# Patient Record
Sex: FEMALE | NOT HISPANIC OR LATINO | URBAN - METROPOLITAN AREA
[De-identification: names, ages, dates, MRNs, and addresses within clinical notes are randomized per-mention and may not be internally consistent; named-entity substitution may affect disease eponyms.]

---

## 2017-07-11 ENCOUNTER — CONVERSION ENCOUNTER (OUTPATIENT)
Dept: OTHER | Facility: HOSPITAL | Age: 72
End: 2017-07-11

## 2018-05-30 ENCOUNTER — OFFICE VISIT CONVERTED (OUTPATIENT)
Dept: FAMILY MEDICINE CLINIC | Age: 73
End: 2018-05-30
Attending: FAMILY MEDICINE

## 2018-08-21 ENCOUNTER — OFFICE VISIT CONVERTED (OUTPATIENT)
Dept: FAMILY MEDICINE CLINIC | Age: 73
End: 2018-08-21
Attending: FAMILY MEDICINE

## 2018-10-10 ENCOUNTER — OFFICE VISIT CONVERTED (OUTPATIENT)
Dept: FAMILY MEDICINE CLINIC | Age: 73
End: 2018-10-10
Attending: FAMILY MEDICINE

## 2019-01-01 ENCOUNTER — HOSPITAL ENCOUNTER (OUTPATIENT)
Dept: OTHER | Facility: HOSPITAL | Age: 74
Discharge: HOME | End: 2019-03-04
Attending: FAMILY MEDICINE

## 2019-01-01 ENCOUNTER — OFFICE VISIT CONVERTED (OUTPATIENT)
Dept: FAMILY MEDICINE CLINIC | Age: 74
End: 2019-01-01
Attending: FAMILY MEDICINE

## 2019-01-01 ENCOUNTER — HOSPITAL ENCOUNTER (OUTPATIENT)
Dept: OTHER | Facility: HOSPITAL | Age: 74
Discharge: HOME | End: 2019-09-27
Attending: FAMILY MEDICINE

## 2019-01-01 ENCOUNTER — HOSPITAL ENCOUNTER (OUTPATIENT)
Dept: OTHER | Facility: HOSPITAL | Age: 74
Discharge: HOME | End: 2019-10-03
Attending: FAMILY MEDICINE

## 2019-01-01 ENCOUNTER — HOSPITAL ENCOUNTER (OUTPATIENT)
Dept: OTHER | Facility: HOSPITAL | Age: 74
Discharge: HOME | End: 2019-06-19
Attending: FAMILY MEDICINE

## 2019-01-01 ENCOUNTER — HOSPITAL ENCOUNTER (OUTPATIENT)
Dept: OTHER | Facility: HOSPITAL | Age: 74
Discharge: HOME | End: 2019-01-14
Attending: FAMILY MEDICINE

## 2019-01-01 LAB
ALBUMIN SERPL-MCNC: 3.7 G/DL (ref 3.5–5)
ALBUMIN/GLOB SERPL: 0.9 {RATIO} (ref 1.4–2.6)
ALP SERPL-CCNC: 89 U/L (ref 43–160)
ALT SERPL-CCNC: 8 U/L (ref 10–40)
ANION GAP SERPL CALC-SCNC: 17 MMOL/L (ref 8–19)
APPEARANCE UR: ABNORMAL
APPEARANCE UR: CLEAR
AST SERPL-CCNC: 17 U/L (ref 15–50)
BACTERIA UR CULT: NORMAL
BILIRUB SERPL-MCNC: 0.32 MG/DL (ref 0.2–1.3)
BILIRUB UR QL: NEGATIVE
BILIRUB UR QL: NEGATIVE
BUN SERPL-MCNC: 15 MG/DL (ref 5–25)
BUN/CREAT SERPL: 19 {RATIO} (ref 6–20)
CALCIUM SERPL-MCNC: 9.5 MG/DL (ref 8.7–10.4)
CHLORIDE SERPL-SCNC: 97 MMOL/L (ref 99–111)
COLOR UR: ABNORMAL
COLOR UR: YELLOW
CONV BACTERIA: ABNORMAL
CONV BACTERIA: NEGATIVE
CONV CO2: 25 MMOL/L (ref 22–32)
CONV COLLECTION SOURCE (UA): ABNORMAL
CONV COLLECTION SOURCE (UA): ABNORMAL
CONV HYALINE CASTS IN URINE MICRO: ABNORMAL /[LPF]
CONV TOTAL PROTEIN: 7.7 G/DL (ref 6.3–8.2)
CONV UROBILINOGEN IN URINE BY AUTOMATED TEST STRIP: 0.2 {EHRLICHU}/DL (ref 0.1–1)
CONV UROBILINOGEN IN URINE BY AUTOMATED TEST STRIP: 1 {EHRLICHU}/DL (ref 0.1–1)
CREAT UR-MCNC: 0.77 MG/DL (ref 0.5–0.9)
ERYTHROCYTE [DISTWIDTH] IN BLOOD BY AUTOMATED COUNT: 12.7 % (ref 11.5–14.5)
GFR SERPLBLD BASED ON 1.73 SQ M-ARVRAT: >60 ML/MIN/{1.73_M2}
GLOBULIN UR ELPH-MCNC: 4 G/DL (ref 2–3.5)
GLUCOSE SERPL-MCNC: 100 MG/DL (ref 65–99)
GLUCOSE UR QL: NEGATIVE MG/DL
GLUCOSE UR QL: NEGATIVE MG/DL
HBA1C MFR BLD: 15 G/DL (ref 12–16)
HCT VFR BLD AUTO: 47.5 % (ref 37–47)
HGB UR QL STRIP: NEGATIVE
HGB UR QL STRIP: NEGATIVE
KETONES UR QL STRIP: NEGATIVE MG/DL
KETONES UR QL STRIP: NEGATIVE MG/DL
LEUKOCYTE ESTERASE UR QL STRIP: ABNORMAL
LEUKOCYTE ESTERASE UR QL STRIP: ABNORMAL
MCH RBC QN AUTO: 28.5 PG (ref 27–31)
MCHC RBC AUTO-ENTMCNC: 31.6 G/DL (ref 33–37)
MCV RBC AUTO: 90.2 FL (ref 81–99)
NITRITE UR QL STRIP: NEGATIVE
NITRITE UR QL STRIP: POSITIVE
OSMOLALITY SERPL CALC.SUM OF ELEC: 279 MOSM/KG (ref 273–304)
PH UR STRIP.AUTO: 5.5 [PH] (ref 5–8)
PH UR STRIP.AUTO: 6.5 [PH] (ref 5–8)
PLATELET # BLD AUTO: 187 10*3/UL (ref 130–400)
PMV BLD AUTO: 10 FL (ref 7.4–10.4)
POTASSIUM SERPL-SCNC: 5.2 MMOL/L (ref 3.5–5.3)
PROT UR QL: ABNORMAL MG/DL
PROT UR QL: NEGATIVE MG/DL
RBC # BLD AUTO: 5.27 10*6/UL (ref 4.2–5.4)
RBC #/AREA URNS HPF: ABNORMAL /[HPF]
RBC #/AREA URNS HPF: ABNORMAL /[HPF]
SODIUM SERPL-SCNC: 134 MMOL/L (ref 135–147)
SP GR UR: 1.01 (ref 1–1.03)
SP GR UR: 1.02 (ref 1–1.03)
SQUAMOUS SPT QL MICRO: ABNORMAL /[HPF]
T3FREE SERPL-MCNC: 3.9 PG/ML (ref 2–4.4)
T4 FREE SERPL-MCNC: 2 NG/DL (ref 0.9–1.8)
TSH SERPL-ACNC: 0.26 M[IU]/L (ref 0.27–4.2)
TSH SERPL-ACNC: 2.21 M[IU]/L (ref 0.27–4.2)
WBC # BLD AUTO: 12.5 10*3/UL (ref 4.8–10.8)
WBC #/AREA URNS HPF: ABNORMAL /[HPF]
WBC #/AREA URNS HPF: ABNORMAL /[HPF]

## 2021-06-05 NOTE — PROGRESS NOTES
Muriel Irene. 1945     Office/Outpatient Visit    Visit Date: Wed, May 30, 2018 10:37 am    Provider: Julisa Bianchi MD (Assistant: Yarelis Arroyo MA)    Location: [facility]        Electronically signed by Julisa Bianchi MD on  06/01/2018 10:23:33 AM                             SUBJECTIVE:        CC: FFT-seeing hospice         HPI:     She presents to the office after transitioning from hospice after no longer meeting the 6 month criteria. She was previously on hospice care for failure to thrive. Currently, she is requesting pain meds for chronic back pain and abd pain. Under hospice, she has been on methadone. Denies constipation and is on stool softeners daily. Muriel also takes reglan and Zofran for n/v but is requesting Phenergan due to faster onset of this medication. She takes Zofran q4h. She has known gastroparesis with workup with GI and Dr Smith and was told nothing else could be done, she is not a candidate for a Gtube per Dr Smith     Her wt is down 11 #s from last year-79 #.  She has a h/o chronically elevated WBC's and anemia due to chronic GI blood loss.  She does have lupus and  I have sent her to rheumatology for her lupus (as a cause of her gastroparesis) and she was told all is ok.  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 Her  is with her today and there have been concerns in the past over his alcohol use and poor care of her            Muriel also has depression and anxiety and currently takes Cymbalta and Zyprexa with improvement in her mood. Denies SI. She has had diminished sleep with terminal insomnia (no initial insomnia) and takes Trazadone nightly.         GERD is stable on pantoprazole.                 Additionally, she presents with history of acquired hypothyroidism, unspecified cause.  this was first diagnosed more than 5 years ago.  She is currently taking Synthroid, 75 mcg  daily.  She denies any related symptoms.  She reports no symptoms suggestive of adverse medication effect.      ROS:     CONSTITUTIONAL:  Positive for fatigue.   Negative for chills or fever.      EYES:  Positive for Legally blind.   Negative for blurred vision.      CARDIOVASCULAR:  Negative for chest pain, orthopnea, palpitations, paroxysmal nocturnal dyspnea, pedal edema and tachycardia.      RESPIRATORY:  Negative for recent cough, dyspnea and frequent wheezing.      GASTROINTESTINAL:  Positive for abdominal pain and nausea.   Negative for constipation, diarrhea or vomiting.      MUSCULOSKELETAL:  Positive for arthralgias, back pain, joint stiffness and hip pain R hip.      INTEGUMENTARY/BREAST:  Negative for rash.      NEUROLOGICAL:  Positive for dizziness.   Negative for headaches or weakness.      PSYCHIATRIC:  Negative for sleep disturbance and suicidal thoughts.          PMH/FMH/SH:     Last Reviewed on 5/30/2018 11:42 AM by Julisa Bianchi    Past Medical History:             PREVENTIVE HEALTH MAINTENANCE             BONE DENSITY: was last done 6/17/15 with the following abnormality noted-- osteoporosis takes Fosamax and calcium with vitamin d     COLONOSCOPY: Done within the last 10 years was last done 2012 with the following abnormalities noted-- diverticulosis     MAMMOGRAM: was last done 7/11/2017 with normal results     PNEUMOCOCCAL 23 VACCINE: was last done unknown         PAST MEDICAL HISTORY         Positive for    Hyperlipidemia and    Hypertension;     Positive for    COPD;     Postive for    Colonic Polyps,    Gastroesophageal Reflux Disease,    Melena due to iron intake and    Diverticulosis;     Positive for    Renal Stones,    Urinary Tract Infections, Recurrent and    Hydronephrosis;     Positive for    Osteoporosis and    Systemic Lupus Erythematosus;     Positive for    Hypothyroidism;     Positive for    Cerebrovascular Accident: 10/28/2001; ;     Positive for    Iron Deficiency  Anemia: due to chronic blood loss; and    Primary hypercoagulable state;     Positive for    Chronic elevated WBC count;     Positive for    Underweight with Adult Failure to Thrive,    Anxiety with depression,    Cortical blindness and    Sudden hearing loss;         GYNECOLOGICAL HISTORY:             CURRENT MEDICAL PROVIDERS:    Gastroenterologist: Dr Dorantes    Oncologist: Dr Johnson    Psychiatrist: Kadoli in [facility]    Rheumatologist: Dr Eden         Surgical History:         Positive for    Arthroscopy: shoulder, left;,    Cholecystectomy: ;,    Hysterectomy: BSO; and    Tonsillectomy + Adenoidectomy;     Positive for    Lung Resection: at age 19; for brochiectasis; ;     Positive for    Endoscopy: ; EGD 2016 bx-mild inflammation, hx ulcers;,    Fracture(s):    fracture of wrist: dx'd in 2016;     and right hip,     Exploratory Laparotomy,    Port placement;,    Wrist cyst surgery;,    cardiac cath  Normal; and    Cystoscopy  with Rt ureteroscopy and stone extraction;;         Family History:     Father:  at age 77; Cause of death was lung cancer     Mother:  at age 70; Cause of death was diabetes related;  cardiomyopathy         Social History:     Occupation: Disabled (due to Lupus)     Marital Status:      Children: 2 children         Tobacco/Alcohol/Supplements:     Last Reviewed on 2018 11:43 AM by Julisa Bianchi    Tobacco: She has never smoked.  Non-drinker         Substance Abuse History:     None             Allergies:     Last Reviewed on 2017 03:07 PM by Corrine Soriano    Ceftin:    Lortab:    Proventil:    Percodan:    Toradol:    Xifaxan:        Current Medications:     Last Reviewed on 2017 03:07 PM by Corrine Soriano    Cymbalta 30mg Capsules, Delayed Release Take 1 capsule(s) by mouth BID     Lorazepam 0.5mg Tablet 1 PO BID     Synthroid 0.075mg Tablet Take 1 tablet(s) by mouth daily--DO NOT SUBSTITUTE!!!!!     Zyprexa 5mg Tablet Take 1  tablet(s) by mouth daily at HS     Protonix 40mg Tablets, Delayed Release 1 tab bid     Trazodone HCl 100mg Tablet 1 tab hs     Lipitor 10mg Tablet One tablet by mouth daily at bedtime.     Promethazine HCl 25mg Tablet 1/2 - 1 tab q 4-6 hrs prn   prn nausea and vomiting     Hydrochlorothiazide (HCTZ) 12.5mg Tablet Take 1 tablet(s) by mouth daily     Carafate 1gm Tablets Take 1 tablet(s) by mouth qid     Ferrous Sulfate 325mg Tablets 1 TAB DAILY     Zofran 4mg Tablet 1 tab po Q6H PRN for nausea     Methadone HCl 10mg Tablet Take 1 tablet(s) by mouth TID         OBJECTIVE:        Vitals:         Current: 5/30/2018 10:39:50 AM    Ht:  5 ft, 5.25 in;  Wt: 79 lbs (Estimated);  BMI: 13.0    T: 97.9 F (oral);  BP: 126/78 mm Hg (left arm, sitting);  P: 65 bpm (left arm (BP Cuff), sitting);  sCr: 0.68 mg/dL;  GFR: 56.86        Exams:     PHYSICAL EXAM:     GENERAL: vital signs recorded - thin, malnourished;  well groomed;  no apparent distress;     E/N/T: OROPHARYNX:  normal mucosa, dentition, gingiva, and posterior pharynx;     NECK: supple, no LAD, FROM;     RESPIRATORY: tender to palpation over sternum and R fibs 5-8; CTA B WITHOUT WHEEZING/RALES OR RHONCHI, NORMAL RESP. EFFORT     CARDIOVASCULAR: regular rate and rhythm; normal S1, S2; no murmur, rub, or gallop; normal PMI;     GASTROINTESTINAL: mild diffuse tenderness;  no guarding;  no rebound tenderness;  normal bowel sounds; no organomegaly; no masses;     MUSCULOSKELETAL: gait: wheelchair-bound;     NEUROLOGIC: mental status: oriented to person, place, and time;  Reflexes: knee jerks: 2+;     PSYCHIATRIC:  appropriate affect and demeanor; normal speech pattern; grossly normal memory;         Lab/Test Results:             Amphetamines Screen, Urin:  Negative (05/30/2018),     BAR-Barbiturates Screen, Urin:  Negative (05/30/2018),     Buprenorphine:  Negative (05/30/2018),     BZO-Benzodiazepines Screen,Ur:  Positive (05/30/2018),     Cocaine(Metab.)Screen, Ur:   Negative (05/30/2018),     MDMA-Ecstasy:  Negative (05/30/2018),     Met-Methamphetamine:  Negative (05/30/2018),     MTD-Methadone Screen, Urine:  Positive (05/30/2018),     Opiate Screen, Urine:  Negative (05/30/2018),     OXY-Oxycodone:  Negative (05/30/2018),     PCP-Phencyclidine Screen, Uri:  Negative (05/30/2018),     THC Cannabinoids Screen, Urin:  Negative (05/30/2018),     Urine temperature:  confirmed (05/30/2018),     Date and time of last pill:  Methadone 5/30/18@8am  Lorazepam 5/30/18@8am (05/30/2018),     Performed by:  tls (05/30/2018),     Collection Time:  1206 (05/30/2018),             ASSESSMENT           783.41   R62.7  FTT              DDx:     780.52   G47.00  Insomnia, unspecified              DDx:     789.00   R10.9  Abdominal pain, unspecified              DDx:     244.9   E03.9  Acquired hypothyroidism, unspecified cause              DDx:     536.3   K31.84  Gastroparesis              DDx:     288.60   D72.829  Leukocytosis, unspecified              DDx:     272.0   E78.00  Essential hypercholesterolemia              DDx:     280.9   D50.9  Iron deficiency anemia              DDx:     300.4   F32.0   F41.1  Anxiety with depression              DDx:     401.1   I10  Essential hypertension, benign              DDx:     710.0   M32.10  Systemic lupus erythematosus              DDx:     V58.69   Z79.899  Use of high risk medications              DDx:     724.2   M54.5  Chronic low back pain              DDx:         ORDERS:         Meds Prescribed:       Refill of: Trazodone HCl 100mg Tablet 1 tab hs  #90 (Ninety) tablet(s) Refills: 1       Refill of: Zofran (Ondansetron HCl) 4mg Tablet 1 tab po Q6H PRN for nausea  #40 (Forty) tablet(s) Refills: 0         Lab Orders:       [id number]  PHYSF - [facility] PHYSICAL: CMP, CBC, TSH, LIPID: [id number], [id number], [id number], [id number]  (Send-Out)         85839  SED - H Sedimentation rate, non-automated ESR  (Send-Out)         15018  Drug test prsmv read direct optical obs pr  date  (In-House)           Procedures Ordered:       REFER  Referral to Specialist or Other Facility  (Send-Out)                   PLAN:          FTT chronic, she is on methadone and I cannot write for this med long term.          Insomnia, unspecified           Prescriptions:       Refill of: Trazodone HCl 100mg Tablet 1 tab hs  #90 (Ninety) tablet(s) Refills: 1       Refill of: Zofran (Ondansetron HCl) 4mg Tablet 1 tab po Q6H PRN for nausea  #40 (Forty) tablet(s) Refills: 0          Abdominal pain, unspecified chronic, she is on methadone and I cannot write for this med long term.          Acquired hypothyroidism, unspecified cause recheck labs today          Gastroparesis cont reglan and zofran, restart supplements tid for her low weight and FTT, Home health is to look in to this for her          Leukocytosis, unspecified     LABORATORY:  Labs ordered to be performed today include PHYSICAL PANEL; CMP, CBC, TSH, LIPID.            Orders:       [id number]  [facility] PHYSICAL: CMP, CBC, TSH, LIPID: [id number], [id number], [id number], [id number]  (Send-Out)            Essential hypercholesterolemia on lipitor, needs fasting lipids          Iron deficiency anemia will recheck labs          Anxiety with depression ongoing but stable          Essential hypertension, benign well controlled          Systemic lupus erythematosus will check ESR today     LABORATORY:  Labs ordered to be performed today include ESR.            Orders:       [id number]  [facility] Sedimentation rate, non-automated ESR  (Send-Out)            Use of high risk medications         RECOMMENDATIONS given include: hua reviewed, drug screen performed and appropriate, consent is reviewed and signed and on the chart, she is aware of risk of addiction on this medication and understands that she will need to follow up for a review every 3 months and her medications will be adjusted or decreased as deemed appropriate at each visit.  No personal history of drug or alcohol abuse.  No  concerns about diversion or abuse.  She denies side effects related to the medication.  She is  aware that she may be called in for pill counts..            Orders:       [id number]  Drug test prsmv read direct optical obs pr date  (In-House)            Chronic low back pain         REFERRALS:  Referral initiated to a chronic pain specialist ( [facility] ).            Orders:       REFER  Referral to Specialist or Other Facility  (Send-Out)               CHARGE CAPTURE           **Please note: ICD descriptions below are intended for billing purposes only and may not represent clinical diagnoses**        Primary Diagnosis:         783.41 FTT            R62.7    Adult failure to thrive              Orders:          [id number]   Office/outpatient visit; established patient, level 4  (In-House)           780.52 Insomnia, unspecified            G47.00    Insomnia, unspecified    789.00 Abdominal pain, unspecified            R10.9    Unspecified abdominal pain    244.9 Acquired hypothyroidism, unspecified cause            E03.9    Hypothyroidism, unspecified    536.3 Gastroparesis            K31.84    Gastroparesis    288.60 Leukocytosis, unspecified            D72.829    Elevated white blood cell count, unspecified    272.0 Essential hypercholesterolemia            E78.00    Pure hypercholesterolemia, unspecified    280.9 Iron deficiency anemia            D50.9    Iron deficiency anemia, unspecified    300.4 Anxiety with depression            F32.0    Major depressive disorder, single episode, mild           F41.1    Generalized anxiety disorder    401.1 Essential hypertension, benign            I10    Essential (primary) hypertension    710.0 Systemic lupus erythematosus            M32.10    Systemic lupus erythematosus, organ or system involvement unspecified    V58.69 Use of high risk medications            Z79.899    Other long term (current) drug therapy              Orders:          [id number]   Drug test prsmv read  direct optical obs pr date  (In-House)           724.2 Chronic low back pain            M54.5    Low back pain        ADDENDUMS:      ____________________________________    Addendum: 06/11/2018 03:31 PM - Corrine Soriano         Laboratory Orders Faxed to:        [facility]; Number [phone]            Addendum: 06/27/2018 02:52 PM - Corrine Soriano         Laboratory Orders Faxed to:        [facility]; Number [phone]            Addendum: 06/27/2018 02:58 PM - Corrine Soriano        Verbal order given to WINSTON Jasso for labs to be drawn at next home visit.

## 2021-06-05 NOTE — PROGRESS NOTES
Muriel Irene 1945     Office/Outpatient Visit    Visit Date: Wed, Oct 10, 2018 11:19 am    Provider: Julisa Bianchi MD (Assistant: Morgan Yarbrough)    Location: [facility]        Electronically signed by Julisa Bianchi MD on  10/16/2018 10:49:24 AM                             SUBJECTIVE:        CC: gastroparesis/chronic abd and back pain         HPI:     Muriel is following up for her chronic gastroparesis and abd pain.  She couldnt be seen last week due to severe nausea but this has improved.   She is on zofran and reglan daily.  She is seeing pain management and on morphine 15 mg bid and methadone 5 mg tid.   Her home health quit and she no longer has her bath aid, and she is wanting home health called in for this, I told her home health only qualifies if she needs nursing or PT.  Yuliet remains unsteady on her feet.  She is getting up to go to bed and bathroom.  She is legally blind.  Her wt is up 2 more #'s.   Her anxiety and depression are improving, she is on cymbalta and zyprexa.  She is sleeping well on trazodone scheduled.         With regard to the acquired hypothyroidism, unspecified cause, this was first diagnosed more than 5 years ago.  She is currently taking Synthroid, 75 mcg daily.  TSH was last checked 3 months ago.  The result was reported as normal.  She denies any related symptoms.  She reports no symptoms suggestive of adverse medication effect.          Additionally, she presents with history of essential hypercholesterolemia.  current treatment includes diet.  Compliance with treatment has been fair.  She denies experiencing any hypercholesterolemia related symptoms.  Most recent lab tests include Total Cholesterol:  161 (mg/dL) (05/16/2017), HDL:  79 (mg/dL) (05/16/2017), Triglycerides:  103 (mg/dL) (05/16/2017), LDL:  61 (mg/dL) (05/16/2017), TSH:  3.160 (mIU/L) (05/16/2017), Creatinine, Serum:  0.72 (mg/dl) (08/21/2018), Glom Filt Rate, Est:  >60 (ml/min/1.73m2) (08/21/2018),  Alkaline Phosphatase, Serum:  107 (U/L) (08/21/2018), ALT (SGPT):  11 (U/L) (08/21/2018), AST (SGOT):  18 (U/L) (08/21/2018), Hemoglobin:  12.10 (gm/dl) (08/21/2018), Hematocrit:  38.4 (%) (08/21/2018).          With regard to the essential hypertension, benign, this was first diagnosed >1-2 years ago.  Current nonpharmacologic treatment includes low sodium diet.  She is not currently taking an antihypertensive.  SERGEY does not check her blood pressure other than at her clinic appointments.  She is tolerating the medication well without side effects.      ROS:     CONSTITUTIONAL:  Positive for fatigue.   Negative for chills or fever.      EYES:  Positive for Legally blind.   Negative for blurred vision.      CARDIOVASCULAR:  Negative for chest pain, orthopnea, palpitations, paroxysmal nocturnal dyspnea, pedal edema and tachycardia.      RESPIRATORY:  Negative for recent cough, dyspnea and frequent wheezing.      GASTROINTESTINAL:  Positive for abdominal pain and nausea.   Negative for constipation, diarrhea or vomiting.      MUSCULOSKELETAL:  Positive for arthralgias, back pain, joint stiffness and hip pain R hip.      INTEGUMENTARY/BREAST:  Negative for rash.      PSYCHIATRIC:  Negative for sleep disturbance and suicidal thoughts.          PMH/FMH/SH:     Last Reviewed on 10/10/2018 11:51 AM by Julisa Bianchi    Past Medical History:             PREVENTIVE HEALTH MAINTENANCE             BONE DENSITY: was last done 6/17/15 with the following abnormality noted-- osteoporosis takes Fosamax and calcium with vitamin d     COLONOSCOPY: Done within the last 10 years was last done 2012 with the following abnormalities noted-- diverticulosis     MAMMOGRAM: was last done 7/11/2017 with normal results     PNEUMOCOCCAL 23 VACCINE: was last done unknown         PAST MEDICAL HISTORY         Positive for    Hyperlipidemia and    Hypertension;     Positive for    COPD;     Postive for    Colonic Polyps,    Gastroesophageal  Reflux Disease,    Melena due to iron intake and    Diverticulosis;     Positive for    Renal Stones,    Urinary Tract Infections, Recurrent and    Hydronephrosis;     Positive for    Osteoporosis and    Systemic Lupus Erythematosus;     Positive for    Hypothyroidism;     Positive for    Cerebrovascular Accident: 10/28/2001; ;     Positive for    Iron Deficiency Anemia: due to chronic blood loss; and    Primary hypercoagulable state;     Positive for    Chronic elevated WBC count;     Positive for    Underweight with Adult Failure to Thrive,    Anxiety with depression,    Cortical blindness and    Sudden hearing loss;         GYNECOLOGICAL HISTORY:             CURRENT MEDICAL PROVIDERS:    Gastroenterologist: Dr Dorantes    Oncologist: Dr Johnson    Pain Management: [facility]    Psychiatrist: [facility]oli in [place]    Rheumatologist: Dr Eden    [facility]: [facility] - d/c 2018         Surgical History:         Positive for    Arthroscopy: shoulder, left;,    Cholecystectomy: ;,    Hysterectomy: BSO; and    Tonsillectomy + Adenoidectomy;     Positive for    Lung Resection: at age 19; for brochiectasis; ;     Positive for    Endoscopy: ; EGD  bx-mild inflammation, hx ulcers;,    Fracture(s):    fracture of wrist: dx'd in 2016;     and right hip,     Exploratory Laparotomy,    Port placement;,    Wrist cyst surgery;,    cardiac cath  Normal; and    Cystoscopy  with Rt ureteroscopy and stone extraction;;         Family History:     Father:  at age 77; Cause of death was lung cancer     Mother:  at age 70; Cause of death was diabetes related;  cardiomyopathy         Social History:     Occupation: Disabled (due to Lupus)     Marital Status:      Children: 2 children         Tobacco/Alcohol/Supplements:     Last Reviewed on 10/10/2018 11:51 AM by Julisa Bianchi    Tobacco: She has never smoked.  Non-drinker         Substance Abuse History:     None             Allergies:     Last  Reviewed on 8/21/2018 12:17 PM by Talisha Dubois April    Ceftin:    Lortab:    Proventil:    Percodan:    Toradol:    Xifaxan:        Current Medications:     Last Reviewed on 8/21/2018 12:20 PM by Talisha Dubois April    Lorazepam 0.5mg Tablet 1 PO BID     Synthroid 0.075mg Tablet Take 1 tablet(s) by mouth daily--DO NOT SUBSTITUTE!!!!!     Protonix 40mg Tablets, Delayed Release 1 tab bid     Cymbalta 30mg Capsules, Delayed Release 1 po tid     Zofran 4mg Tablet 1 tab po Q6H PRN for nausea     Methadone HCl 5mg Tablet Take one tablet TID     Trazodone HCl 100mg Tablet 1 tab hs     Zyprexa 5mg Tablet Take 1 tablet(s) by mouth daily at HS     Carafate 1gm Tablets Take 1 tablet(s) by mouth qid     Morphine Sulfate 15mg Tablet Take 1 Tablet BID         OBJECTIVE:        Vitals:         Current: 10/10/2018 11:25:07 AM    Ht:  5 ft, 5.25 in;  Wt: 98 lbs;  BMI: 16.2    T: 98.3 F (oral);  BP: 140/85 mm Hg (right arm, sitting);  P: 86 bpm (left arm (BP Cuff), sitting);  sCr: 0.72 mg/dL;  GFR: 58.85        Exams:     PHYSICAL EXAM:     GENERAL: vital signs recorded - thin, malnourished;  well groomed;  no apparent distress;     E/N/T: OROPHARYNX:  normal mucosa, dentition, gingiva, and posterior pharynx;     NECK: supple, no LAD, FROM;     RESPIRATORY: CTA B WITHOUT WHEEZING/RALES OR RHONCHI, NORMAL RESP. EFFORT     CARDIOVASCULAR: regular rate and rhythm; normal S1, S2; no murmur, rub, or gallop; normal PMI;     GASTROINTESTINAL: mild diffuse tenderness;  no guarding;  no rebound tenderness;  normal bowel sounds; no organomegaly; no masses;     MUSCULOSKELETAL: gait: wheelchair-bound;     NEUROLOGIC: mental status: oriented to person, place, and time;  Reflexes: knee jerks: 2+;     PSYCHIATRIC:  appropriate affect and demeanor; normal speech pattern; grossly normal memory;         ASSESSMENT           789.00   R10.9  Abdominal pain, unspecified              DDx:     536.3   K31.84  Gastroparesis              DDx:      780.52   G47.00  Insomnia, unspecified              DDx:     244.9   E03.9  Acquired hypothyroidism, unspecified cause              DDx:     280.9   D50.9  Iron deficiency anemia              DDx:     300.4   F32.0   F41.1  Anxiety with depression              DDx:     401.1   I10  Essential hypertension, benign              DDx:     724.2   M54.5  Chronic low back pain              DDx:         ORDERS:         Lab Orders:       [id number]  TSH - [facility] TSH  (Send-Out)         72041  BDCB2 - [facility] CBC w/o diff  (Send-Out)                   PLAN:          Abdominal pain, unspecified stable on pain meds          Gastroparesis stable, cont zofran and reglan          Insomnia, unspecified stable on trazodone          Acquired hypothyroidism, unspecified cause stable on meds     LABORATORY:  Labs ordered to be performed today include TSH.            Orders:       [id number]  TSH - [facility] TSH  (Send-Out)            Iron deficiency anemia stable     LABORATORY:  Labs ordered to be performed today include CBC W/O DIFF.            Orders:       [id number]  BDCB2 - [facility] CBC w/o diff  (Send-Out)            Anxiety with depression ongoing but stable          Essential hypertension, benign stable          Chronic low back pain cont pain management             Other Orders:       [id number]  Office/outpatient visit; established patient, level 4  (In-House)           CHARGE CAPTURE           **Please note: ICD descriptions below are intended for billing purposes only and may not represent clinical diagnoses**        Primary Diagnosis:         789.00 Abdominal pain, unspecified            R10.9    Unspecified abdominal pain    536.3 Gastroparesis            K31.84    Gastroparesis    780.52 Insomnia, unspecified            G47.00    Insomnia, unspecified    244.9 Acquired hypothyroidism, unspecified cause            E03.9    Hypothyroidism, unspecified    280.9 Iron deficiency anemia            D50.9    Iron deficiency anemia, unspecified    300.4 Anxiety with  depression            F32.0    Major depressive disorder, single episode, mild           F41.1    Generalized anxiety disorder    401.1 Essential hypertension, benign            I10    Essential (primary) hypertension    724.2 Chronic low back pain            M54.5    Low back pain        Other Orders:           [id number]   Office/outpatient visit; established patient, level 4  (In-House)

## 2021-06-05 NOTE — PROGRESS NOTES
"Muriel Irene \"Yuliet\" 1945     Office/Outpatient Visit    Visit Date: Fri, Oct 25, 2019 01:25 pm    Provider: Mike Hooks MD (Assistant: Sarah Spurling, MA)    Location: [facility]        Electronically signed by Mike Hooks MD on  11/08/2019 07:07:03 AM                             SUBJECTIVE:        CC:     Mrs. Irene is a 74 year old White female.  The patient is accompanied into the exam room by her caretaker and whose name is Samra.  Pt is no longer a hospice pt, and she is here because she said Dr. Hooks wanted to see her.;         HPI:     Yuliet comes in today having recently been discharged from hospice services.  She seems in good humor and almost elated.  She says she is \"feisty\".  She has chronic gastroparesis and recurrent GI bleeding history.  Is been stable though for the past several months leading to second discharge from hospice.  Her pain currently is under good control in fact she says that her caregiver is pulling up syringes of morphine at nighttime and she is not using them.     Her anxiety and depression are okay on the current medications.  She even learned today that her brother-in-law is getting close to death seems to be coping well with that.         Concerning acquired hypothyroidism, unspecified cause, she denies any related symptoms.  She reports no symptoms suggestive of adverse medication effect.      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     ROS:     CONSTITUTIONAL:  Negative for chills, fatigue, fever, and weight change.      EYES:  Negative for blurred vision.      CARDIOVASCULAR:  Negative for chest pain, orthopnea, paroxysmal nocturnal dyspnea and pedal edema.      RESPIRATORY:  Negative for dyspnea.      GASTROINTESTINAL:  Negative for abdominal pain, constipation, diarrhea, nausea and vomiting.      GENITOURINARY:  Negative for dysuria and frequent urination.      NEUROLOGICAL:  Negative for dizziness, headaches, " paresthesias, and weakness.      PSYCHIATRIC:  Negative for anxiety, depression, and sleep disturbances.          PMH/FMH/SH:     Last Reviewed on 3/04/2019 12:45 PM by Julisa Bianchi    Past Medical History:             PREVENTIVE HEALTH MAINTENANCE             BONE DENSITY: was last done 6/17/15 with the following abnormality noted-- osteoporosis takes Fosamax and calcium with vitamin d     COLORECTAL CANCER SCREENING: Up to date (colonoscopy q10y; sigmoidoscopy q5y; Cologuard q3y) was last done 12, Results are in chart; colonoscopy with the following abnormalities noted-- diverticulosis     MAMMOGRAM: Done within last 2 years and results in are chart was last done 2017 with normal results         PAST MEDICAL HISTORY         Positive for    Hyperlipidemia and    Hypertension;     Positive for    COPD;     Postive for    Colonic Polyps,    Gastroesophageal Reflux Disease,    Melena due to iron intake and    Diverticulosis;     Positive for    Renal Stones,    Urinary Tract Infections, Recurrent and    Hydronephrosis;     Positive for    Osteoporosis and    Systemic Lupus Erythematosus;     Positive for    Hypothyroidism;     Positive for    Cerebrovascular Accident: 10/28/2001; ;     Positive for    Iron Deficiency Anemia: due to chronic blood loss; and    Primary hypercoagulable state;     Positive for    Chronic elevated WBC count;     Positive for    Underweight with Adult Failure to Thrive,    Anxiety with depression,    Cortical blindness and    Sudden hearing loss;         GYNECOLOGICAL HISTORY:             CURRENT MEDICAL PROVIDERS:    Gastroenterologist: Dr Dorantes    Oncologist: Dr Johnson    Pain Management: [facility]    Psychiatrist: [facility] in [place]    Rheumatologist: Dr Eden    [facility]: [facility] - d/c 2018             ADVANCED DIRECTIVES: None         Surgical History:         Positive for    Arthroscopy: shoulder, left;,    Cholecystectomy: ;,    Hysterectomy: BSO; and     Tonsillectomy + Adenoidectomy;     Positive for    Lung Resection: at age 19; for brochiectasis; ;     Positive for    Endoscopy: ; EGD 2016 bx-mild inflammation, hx ulcers;,    Fracture(s):    fracture of wrist: dx'd in 2016;     and right hip,     Exploratory Laparotomy,    Port placement;,    Wrist cyst surgery;,    cardiac cath  Normal; and    Cystoscopy  with Rt ureteroscopy and stone extraction;;         Family History:     Father:  at age 77; Cause of death was lung cancer     Mother:  at age 70; Cause of death was diabetes related;  cardiomyopathy         Social History:     Occupation: Disabled (due to Lupus)     Marital Status:      Children: 2 children         Tobacco/Alcohol/Supplements:     Last Reviewed on 3/04/2019 12:45 PM by Julisa Bianchi    Tobacco: She has never smoked.  Non-drinker         Substance Abuse History:     None             Allergies:     Last Reviewed on 3/04/2019 11:58 AM by Morgan Yarbrough    Ceftin:    Lortab:    Proventil:    Percodan:    Toradol:    Xifaxan:        Current Medications:     Last Reviewed on 3/04/2019 12:01 PM by Morgan Yarbrough    Lorazepam 0.5mg Tablet 1 PO BID     Morphine Sulfate 15mg Tablets, Extended Release One PO BID     Metoprolol 25mg Tablet 1/2 tab bid     Promethazine HCl 12.5mg Tablet 1 tab every 6 hours as needed for nausea/vomiting.     Cymbalta 30mg Capsules, Delayed Release 1 po tid     Synthroid 0.075mg Tablet Take 1 tablet(s) by mouth daily--DO NOT SUBSTITUTE!!!!!     Protonix 40mg Tablets, Delayed Release 1 tab bid     Trazodone HCl 100mg Tablet 1 tab hs     Ferrous Sulfate 325mg Tablets 1 tablet 3x/week     Zyprexa 5mg Tablet Take 1 tablet(s) by mouth daily at HS     Carafate 1gm Tablets Take 1 tablet(s) by mouth qid     Methadone HCl 10mg Tablet Take 1 tablet QAM, HALF tablet at midday, and 1 tablet QPM     Morphine Sulfate 20mg/1ml Oral Solution Take 0.5 ml (10 mg) po Q 4 hours prn pain, severe anxiety, or  respiratory distress     Cipro 500mg Tablet one tab BID         OBJECTIVE:        Vitals:         Current: 10/25/2019 1:32:23 PM    Ht:  5 ft, 5.25 in;  Wt: 96.6 lbs;  BMI: 16.0    T: 97.9 F (oral);  BP: 144/107 mm Hg (right arm, sitting);  P: 68 bpm (right arm (BP Cuff), sitting);  sCr: 0.77 mg/dL;  GFR: 53.91        Repeat:     1:32:42 PM     BP:   120/64mm Hg (right arm, sitting, Heart Rate: 66)         Exams:     PHYSICAL EXAM:     GENERAL: vital signs recorded - thin, malnourished;  well groomed;  no apparent distress;     EYES: She is blind.  Eyes are nonicteric.;     E/N/T: OROPHARYNX: oral mucosa is normal; Several missing teeth and poor dentition; posterior pharynx, including tonsils, tongue, and uvula are normal;     NECK: range of motion is normal; thyroid exam reveals no discrete nodules;  carotid exam is normal with good upstroke and no bruits;     RESPIRATORY: normal respiratory rate and pattern with no distress; normal breath sounds with no rales, rhonchi, wheezes or rubs;     CARDIOVASCULAR: regular rate and rhythm; normal S1, S2; no murmur, rub, or gallop; normal PMI;     GASTROINTESTINAL: mild tenderness;  no guarding;  no rebound tenderness;  normal bowel sounds; no organomegaly; no masses;     LYMPHATIC: no enlargement of cervical or facial nodes; no supraclavicular nodes;     MUSCULOSKELETAL: Does have some muscle wasting;     NEUROLOGIC: She does have a blindness related to the previous stroke but otherwise no lateralizing focal neurologic deficits;     PSYCHIATRIC: She is oriented and conversive today.  Almost getting.  She is obviously pretty happy to be out of the house and declared free of hospice services;         Procedures:     Influenza vaccination     1. Influenza high dose 0.5 ml unit dose, [facility], ABN signed given IM in the right upper arm; administered by [facility];  lot number [id number]; expires 5/26/2020             ASSESSMENT           536.3   K31.84  Gastroparesis              DDx:      300.4   F32.0   F41.1  Anxiety with depression              DDx:     338.4   G89.4  Chronic pain syndrome              DDx:     244.9   E03.9  Acquired hypothyroidism, unspecified cause              DDx:     V04.81   Z23  Influenza vaccination              DDx:     377.75   H47.619  Cortical blindness              DDx:         ORDERS:         Meds Prescribed:       Refill of: Morphine Sulfate 15mg Tablets, Extended Release One PO BID  #40 (Forty) tablet(s) Refills: 0       Refill of: Methadone HCl 10mg Tablet Take 1 tablet QAM, HALF tablet at midday, and 1 tablet QPM  #50 (Fifty) tablet(s) Refills: 0       Refill of: Morphine Sulfate 20mg/1ml Oral Solution Take 0.5 ml (10 mg) po Q 4 hours prn pain, severe anxiety, or respiratory distress  #60 (Sixty) ml Refills: 0         Procedures Ordered:       [id number]  Fluzone High Dose  (In-House)           Other Orders:         Administration of influenza virus vaccine (x1)                 PLAN:          Gastroparesis     We will continue her current pain medications with anticipation of slowly weaning over time.  Gave her the expectation that the wound could take perhaps a year or more.  Think this will give her off of the opioids she might have some return of her gastric function.     Ms. Irene needs to keep her hospital bed which she has used while receiving hospice services due to her medical condition which requires positioning of the body not feasible with an ordinary bed and she needs the gel overlay pressure mattress pad due to admitted mobility and the fact that she cannot independently make changes in body position significant enough to alleviate pressure and she suffers from severe impaired nutritional status.           Prescriptions:       Refill of: Morphine Sulfate 15mg Tablets, Extended Release One PO BID  #40 (Forty) tablet(s) Refills: 0       Refill of: Methadone HCl 10mg Tablet Take 1 tablet QAM, HALF tablet at midday, and 1 tablet QPM  #50 (Fifty)  tablet(s) Refills: 0       Refill of: Morphine Sulfate 20mg/1ml Oral Solution Take 0.5 ml (10 mg) po Q 4 hours prn pain, severe anxiety, or respiratory distress  #60 (Sixty) ml Refills: 0          Anxiety with depression Seems to be doing quite well on her current medication for now.  Think her caregiver to handle getting her out and involved and is very therapeutic          Chronic pain syndrome As noted above we will do a slow taper of medication          Acquired hypothyroidism, unspecified cause We will continue current medication for now.  Going to be seeing her on a monthly basis and will be checking labs in the next few visits          Influenza vaccination           Orders:       [id number]  Fluzone High Dose  (In-House)                     Administration of influenza virus vaccine (x1)          Cortical blindness Continue with supportive care             Patient Recommendations:    Dragon transcription disclaimer:        Much of this encounter note is an electronic transcription/translation of spoken language to printed text.  The electronic translation of spoken language may permit erroneous, or at times, nonsensical words or phrases to be inadvertently transcribed.  Although I have reviewed the note for such errors, some may still exist.             CHARGE CAPTURE           **Please note: ICD descriptions below are intended for billing purposes only and may not represent clinical diagnoses**        Primary Diagnosis:         536.3 Gastroparesis            K31.84    Gastroparesis              Orders:          [id number]   Office/outpatient visit; established patient, level 4  (In-House)           300.4 Anxiety with depression            F32.0    Major depressive disorder, single episode, mild           F41.1    Generalized anxiety disorder    338.4 Chronic pain syndrome            G89.4    Chronic pain syndrome    244.9 Acquired hypothyroidism, unspecified cause            E03.9    Hypothyroidism, unspecified     V04.81 Influenza vaccination            Z23    Encounter for immunization              Orders:          [id number]   Fluzone High Dose  (In-House)                                           Administration of influenza virus vaccine (x1)         377.75 Cortical blindness            H47.619    Cortical blindness, unspecified side of brain        ADDENDUMS:      ____________________________________    Addendum: 11/08/2019 08:55 AM - Three, Team         Visit Note Faxed to:        [facility] ; Number [phone]       [facility] ; Number [phone]

## 2021-06-05 NOTE — PROGRESS NOTES
Muriel Irene 1945     Office/Outpatient Visit    Visit Date: Tue, Aug 21, 2018 12:09 pm    Provider: Julisa Bianchi MD (Assistant: Talisha Dubois LPN)    Location: [facility]        Electronically signed by Julisa Bianchi MD on  08/28/2018 11:16:45 AM                             SUBJECTIVE:        CC: FFT/gastroparesis/chronic abd and back pain         HPI:     Muriel is following up for her chronic abd pain and back pain with gastroparesis (on reglan and zofran daily), she is still on pain meds methadone and seeing pain management, she is struggling with pain management and how they are maintaining her pain meds.  Her pain is always 8-9/10 most days with morphine 15 mg bid, and methodone 5 mg tid.  She thinks she was better on 10 mg qid. She is wanting to go back on hospice for her pain meds. She also has chronic failure to thrive with low body mass.  Her wt today is up from 79# to 96#'s.          Her thyroid is well controlled.  H/O stroke 17 years ago.  She is legally blind.  She feels hopeless, she is not suicdial but states she wakes up every morning in pain and prays to God and asks for him to take her out of her misery. She is on cymbalt and zyprexa.  She sleeps well with trazodone.  GERD is stable on pantoprazole.         she now has black and tarry stools again and she has h/o GI bleed with anemia in past.  She is also vomiting intermittently and it is black.         Additionally, she presents with history of acquired hypothyroidism, unspecified cause.  this was first diagnosed more than 5 years ago.  She is currently taking Synthroid, 75 mcg daily.  TSH was last checked 3 months ago.  The result was reported as normal.  She denies any related symptoms.  She reports no symptoms suggestive of adverse medication effect.      ROS:     CONSTITUTIONAL:  Positive for fatigue.   Negative for chills or fever.      EYES:  Positive for Legally blind.   Negative for blurred vision.      E/N/T:   Negative for ear pain, nasal congestion and sore throat.      CARDIOVASCULAR:  Negative for chest pain, orthopnea, palpitations, paroxysmal nocturnal dyspnea, pedal edema and tachycardia.      RESPIRATORY:  Negative for recent cough, dyspnea and frequent wheezing.      GASTROINTESTINAL:  Positive for abdominal pain and nausea.   Negative for constipation, diarrhea or vomiting.      MUSCULOSKELETAL:  Positive for arthralgias, back pain, joint stiffness and hip pain R hip.      INTEGUMENTARY/BREAST:  Negative for rash.      NEUROLOGICAL:  Positive for dizziness.   Negative for headaches or weakness.      PSYCHIATRIC:  Negative for sleep disturbance and suicidal thoughts.          PMH/FMH/SH:     Last Reviewed on 8/21/2018 01:09 PM by Julisa Bianchi    Past Medical History:             PREVENTIVE HEALTH MAINTENANCE             BONE DENSITY: was last done 6/17/15 with the following abnormality noted-- osteoporosis takes Fosamax and calcium with vitamin d     COLONOSCOPY: Done within the last 10 years was last done 2012 with the following abnormalities noted-- diverticulosis     MAMMOGRAM: was last done 7/11/2017 with normal results     PNEUMOCOCCAL 23 VACCINE: was last done unknown         PAST MEDICAL HISTORY         Positive for    Hyperlipidemia and    Hypertension;     Positive for    COPD;     Postive for    Colonic Polyps,    Gastroesophageal Reflux Disease,    Melena due to iron intake and    Diverticulosis;     Positive for    Renal Stones,    Urinary Tract Infections, Recurrent and    Hydronephrosis;     Positive for    Osteoporosis and    Systemic Lupus Erythematosus;     Positive for    Hypothyroidism;     Positive for    Cerebrovascular Accident: 10/28/2001; ;     Positive for    Iron Deficiency Anemia: due to chronic blood loss; and    Primary hypercoagulable state;     Positive for    Chronic elevated WBC count;     Positive for    Underweight with Adult Failure to Thrive,    Anxiety with  depression,    Cortical blindness and    Sudden hearing loss;         GYNECOLOGICAL HISTORY:             CURRENT MEDICAL PROVIDERS:    Gastroenterologist: Dr Dorantes    Oncologist: Dr Johnson    Pain Management: [facility]    Psychiatrist: Kadoli in [place]    Rheumatologist: Dr Eden    [place] [facility]: [facility]         Surgical History:         Positive for    Arthroscopy: shoulder, left;,    Cholecystectomy: ;,    Hysterectomy: BSO; and    Tonsillectomy + Adenoidectomy;     Positive for    Lung Resection: at age 19; for brochiectasis; ;     Positive for    Endoscopy: ; EGD 2016 bx-mild inflammation, hx ulcers;,    Fracture(s):    fracture of wrist: dx'd in 2016;     and right hip,     Exploratory Laparotomy,    Port placement;,    Wrist cyst surgery;,    cardiac cath  Normal; and    Cystoscopy  with Rt ureteroscopy and stone extraction;;         Family History:     Father:  at age 77; Cause of death was lung cancer     Mother:  at age 70; Cause of death was diabetes related;  cardiomyopathy         Social History:     Occupation: Disabled (due to Lupus)     Marital Status:      Children: 2 children         Tobacco/Alcohol/Supplements:     Last Reviewed on 2018 01:09 PM by Julisa Bianchi    Tobacco: She has never smoked.  Non-drinker         Substance Abuse History:     None             Allergies:     Last Reviewed on 2018 12:17 PM by Talisha Dubois April    Ceftin:    Lortab:    Proventil:    Percodan:    Toradol:    Xifaxan:        Current Medications:     Last Reviewed on 2018 12:20 PM by Talisha Dubois April    Cymbalta 30mg Capsules, Delayed Release Take 1 capsule(s) by mouth BID     Zofran 4mg Tablet 1 tab po Q6H PRN for nausea     Lorazepam 0.5mg Tablet 1 PO BID     Synthroid 0.075mg Tablet Take 1 tablet(s) by mouth daily--DO NOT SUBSTITUTE!!!!!     Methadone HCl 5mg Tablet Take one tablet TID     Trazodone HCl 100mg Tablet 1 tab hs     Zyprexa 5mg Tablet  Take 1 tablet(s) by mouth daily at HS     Protonix 40mg Tablets, Delayed Release 1 tab bid     Carafate 1gm Tablets Take 1 tablet(s) by mouth qid     Morphine Sulfate 15mg Tablet Take 1 Tablet BID         OBJECTIVE:        Vitals:         Current: 8/21/2018 12:17:36 PM    Ht:  5 ft, 5.25 in;  Wt: 96.6 lbs;  BMI: 16.0    T: 98.9 F (oral);  BP: 142/64 mm Hg (left arm, sitting);  P: 78 bpm (left arm (BP Cuff), sitting);  sCr: 0.68 mg/dL;  GFR: 61.93        Exams:     PHYSICAL EXAM:     GENERAL: vital signs recorded - thin, malnourished;  well groomed;  no apparent distress;     E/N/T: OROPHARYNX:  normal mucosa, dentition, gingiva, and posterior pharynx;     NECK: supple, no LAD, FROM;     RESPIRATORY: tender to palpation over sternum and R fibs 5-8; CTA B WITHOUT WHEEZING/RALES OR RHONCHI, NORMAL RESP. EFFORT     CARDIOVASCULAR: regular rate and rhythm; normal S1, S2; no murmur, rub, or gallop; normal PMI;     GASTROINTESTINAL: mild diffuse tenderness;  no guarding;  no rebound tenderness;  normal bowel sounds; no organomegaly; no masses;     MUSCULOSKELETAL: gait: wheelchair-bound;     NEUROLOGIC: mental status: oriented to person, place, and time;  Reflexes: knee jerks: 2+;     PSYCHIATRIC:  appropriate affect and demeanor; normal speech pattern; grossly normal memory;         ASSESSMENT           783.41   R62.7  FTT              DDx:     780.52   G47.00  Insomnia, unspecified              DDx:     789.00   R10.9  Abdominal pain, unspecified              DDx:     244.9   E03.9  Acquired hypothyroidism, unspecified cause              DDx:     536.3   K31.84  Gastroparesis              DDx:     280.9   D50.9  Iron deficiency anemia              DDx:     300.4   F32.0   F41.1  Anxiety with depression              DDx:     401.1   I10  Essential hypertension, benign              DDx:     724.2   M54.5  Chronic low back pain              DDx:         ORDERS:         Meds Prescribed:       Refill of: Cymbalta (Duloxetine  HCl) 30mg Capsules, Delayed Release 1 po tid  #90 (Ninety) capsule(s) Refills: 2         Lab Orders:       [id number]  [facility] CBC w/o diff  (Send-Out)         [id number]  [facility] Comp. Metabolic Panel  (Send-Out)                   PLAN:          FTT she is doing better with supplements tid and her wt is up 20 #'s           Prescriptions:       Refill of: Cymbalta (Duloxetine HCl) 30mg Capsules, Delayed Release 1 po tid  #90 (Ninety) capsule(s) Refills: 2          Insomnia, unspecified better on trazodone          Abdominal pain, unspecified with black tarry stools and vomiting/chronic nausea (no vomiting for past week per her )          Acquired hypothyroidism, unspecified cause stable on meds          Gastroparesis cont reglan and zofran, restart supplements tid for her low weight and FTT, Home health is to look in to this for her          Iron deficiency anemia will recheck labs          Anxiety with depression ongoing but stable          Essential hypertension, benign well controlled          Chronic low back pain she is still in a lot of pain, will have her nurse care coordinator call and express her pain concerns with morphine and methadone             Other Orders:       [id number]  [address] CBC w/o diff  (Send-Out)         41475  [facility] Comp. Metabolic Panel  (Send-Out)           CHARGE CAPTURE           **Please note: ICD descriptions below are intended for billing purposes only and may not represent clinical diagnoses**        Primary Diagnosis:         783.41 FTT            R62.7    Adult failure to thrive              Orders:          [id number]   Office/outpatient visit; established patient, level 4  (In-House)           780.52 Insomnia, unspecified            G47.00    Insomnia, unspecified    789.00 Abdominal pain, unspecified            R10.9    Unspecified abdominal pain    244.9 Acquired hypothyroidism, unspecified cause            E03.9    Hypothyroidism, unspecified    536.3 Gastroparesis             K31.84    Gastroparesis    280.9 Iron deficiency anemia            D50.9    Iron deficiency anemia, unspecified    300.4 Anxiety with depression            F32.0    Major depressive disorder, single episode, mild           F41.1    Generalized anxiety disorder    401.1 Essential hypertension, benign            I10    Essential (primary) hypertension    724.2 Chronic low back pain            M54.5    Low back pain

## 2021-06-05 NOTE — PROGRESS NOTES
Muriel Irene 1945     Office/Outpatient Visit    Visit Date: Mon, Mar 4, 2019 11:54 am    Provider: Julisa Bianchi MD (Assistant: Morgan Yarbrough)    Location: [facility]        Electronically signed by Julisa Bianchi MD on  03/07/2019 09:31:27 AM                             SUBJECTIVE:        CC:     YULIET is a 74 year old White female.  She is here today following a transition of care from an inpatient hospital: [facility]. The patient was admitted on 2/26/19-2/28/19 for abdominal pain and syncope. Our office called the patient within 48 hours of discharge and scheduled the follow-up appointment.. During the patient's hospital stay the patient was treated by Dr. Dorsey.  (synthroid is 100 mcg, she takes methadone once a day, not taking zyprexa, carafate, or iron)         HPI:     Muriel went  to ER with acute worsening of her abdominal pain with diarrhea and a fall at home.  She was admitted on 2/26 with acute gastroenteritis with GI bleed requiring 2 units of PRBC in hospital, as well as dehydreation, leukocystosis, hypotension, and elevated troponin.    She was dx with recent non ST elevation MI. She was told nothing else could be done for her at the hospital and was d/c home on 2/28 with [facility] home health for PT/OT nursing.  She has known chronic severe protein calorie malnutrition.        Labs  WBC 21,000, CT head neg for acute hemorrhage or mass affect but extensive findings c/w CVA's.  L spine xray shows mild old compression fracture T12/L1 with moderate disc narrowing.  Hgb 12, Na 136, K 4.3, Ca 8.1.  CT abd and pelvis showed increased  bowel gas        She was seen by cardiology and she was told medical management of her heart only at this time.        Today Yuliet states she is miserable, she is in constant severe pain with severe malnutrition and has no desire to eat or drink and she doesnt want to go to the hospital anymore, she wants to get back on hospice -this was initiated again at the hospital         D/C medication: trazodone, pantoprazole, ativan, levothyroxine, duloxetine, morphine, metoprolol, promethazine, methadone, bisacodyl.        She is not eating or drinking well at home.  Her BM are every other day, sometimes loose stools, and she still has intermitted dark tarry looking stools.  She is on colace and miralax     ROS:     CONSTITUTIONAL:  Positive for fatigue.   Negative for chills or fever.      EYES:  Positive for Legally blind.   Negative for blurred vision.      CARDIOVASCULAR:  Negative for chest pain, orthopnea, palpitations, paroxysmal nocturnal dyspnea, pedal edema and tachycardia.      RESPIRATORY:  Negative for recent cough, dyspnea and frequent wheezing.      GASTROINTESTINAL:  Positive for abdominal pain and nausea.   Negative for constipation, diarrhea or vomiting.      MUSCULOSKELETAL:  Positive for arthralgias, back pain, joint stiffness and hip pain R hip.      INTEGUMENTARY/BREAST:  Negative for rash.      PSYCHIATRIC:  Negative for sleep disturbance and suicidal thoughts.          PMH/FMH/SH:     Last Reviewed on 3/04/2019 12:45 PM by Julisa Bianchi    Past Medical History:             PREVENTIVE HEALTH MAINTENANCE             BONE DENSITY: was last done 6/17/15 with the following abnormality noted-- osteoporosis takes Fosamax and calcium with vitamin d     COLORECTAL CANCER SCREENING: Up to date (colonoscopy q10y; sigmoidoscopy q5y; Cologuard q3y) was last done 1/23/12, Results are in chart; colonoscopy with the following abnormalities noted-- diverticulosis     MAMMOGRAM: Done within last 2 years and results in are chart was last done 7/11/2017 with normal results         PAST MEDICAL HISTORY         Positive for    Hyperlipidemia and    Hypertension;     Positive for    COPD;     Postive for    Colonic Polyps,    Gastroesophageal Reflux Disease,    Melena due to iron intake and    Diverticulosis;     Positive for    Renal Stones,    Urinary Tract Infections, Recurrent  and    Hydronephrosis;     Positive for    Osteoporosis and    Systemic Lupus Erythematosus;     Positive for    Hypothyroidism;     Positive for    Cerebrovascular Accident: 10/28/2001; ;     Positive for    Iron Deficiency Anemia: due to chronic blood loss; and    Primary hypercoagulable state;     Positive for    Chronic elevated WBC count;     Positive for    Underweight with Adult Failure to Thrive,    Anxiety with depression,    Cortical blindness and    Sudden hearing loss;         GYNECOLOGICAL HISTORY:             CURRENT MEDICAL PROVIDERS:    Gastroenterologist: Dr Dorantes    Oncologist: Dr Johnson    Pain Management: [facility]    Psychiatrist: [facility]oli in [place]    Rheumatologist: Dr Eden    Home health: [facility] - d/c 2018             ADVANCED DIRECTIVES: None         Surgical History:         Positive for    Arthroscopy: shoulder, left;,    Cholecystectomy: ;,    Hysterectomy: BSO; and    Tonsillectomy + Adenoidectomy;     Positive for    Lung Resection: at age 19; for brochiectasis; ;     Positive for    Endoscopy: ; EGD  bx-mild inflammation, hx ulcers;,    Fracture(s):    fracture of wrist: dx'd in ;     and right hip,     Exploratory Laparotomy,    Port placement;,    Wrist cyst surgery;,    cardiac cath  Normal; and    Cystoscopy  with Rt ureteroscopy and stone extraction;;         Family History:     Father:  at age 77; Cause of death was lung cancer     Mother:  at age 70; Cause of death was diabetes related;  cardiomyopathy         Social History:     Occupation: Disabled (due to Lupus)     Marital Status:      Children: 2 children         Tobacco/Alcohol/Supplements:     Last Reviewed on 3/04/2019 12:45 PM by Julisa Bianchi    Tobacco: She has never smoked.  Non-drinker         Substance Abuse History:     None             Allergies:     Last Reviewed on 2019 12:04 PM by Spurling, Sarah C    Ceftin:    Lortab:    Proventil:    Percodan:     Toradol:    Xifaxan:        Current Medications:     Last Reviewed on 1/14/2019 12:08 PM by Spurling, Sarah C    Metoprolol 25mg Tablet 1/2 tab bid     Lorazepam 0.5mg Tablet 1 PO BID     Promethazine HCl 12.5mg Tablet 1 tab every 6 hours as needed for nausea/vomiting.     Synthroid 0.075mg Tablet Take 1 tablet(s) by mouth daily--DO NOT SUBSTITUTE!!!!!     Protonix 40mg Tablets, Delayed Release 1 tab bid     Cymbalta 30mg Capsules, Delayed Release 1 po tid     Trazodone HCl 100mg Tablet 1 tab hs     Ferrous Sulfate 325mg Tablets 1 tablet 3x/week     Methadone HCl 5mg Tablet Take one tablet TID     Zyprexa 5mg Tablet Take 1 tablet(s) by mouth daily at HS     Carafate 1gm Tablets Take 1 tablet(s) by mouth qid     Morphine Sulfate 15mg Tablet Take 1 Tablet BID         OBJECTIVE:        Vitals:         Current: 3/4/2019 12:03:36 PM    Ht:  5 ft, 5.25 in;  Wt: 84 lbs (Estimated);  BMI: 13.9    T: 97.1 F (oral);  BP: 97/56 mm Hg (left arm, sitting);  P: 69 bpm (left arm (BP Cuff), sitting);  R: 16 bpm;  sCr: 0.77 mg/dL;  GFR: 50.80    O2 Sat: 88 % (room air)        Exams:     PHYSICAL EXAM:     GENERAL: vital signs recorded - thin, malnourished;  disheveled appearance;  no apparent distress, tired-appearing;     EYES: R sclera is injected, no drainage; PERRL, EOMI     E/N/T: OROPHARYNX: oral mucosa reveals dryness;  posterior pharynx, including tonsils, tongue, and uvula are normal;     NECK: supple, no LAD, FROM;     RESPIRATORY: normal respiratory rate and pattern with no distress; normal breath sounds with no rales, rhonchi, wheezes or rubs;     CARDIOVASCULAR: regular rate and rhythm; normal S1, S2; no murmur, rub, or gallop; normal PMI;     GASTROINTESTINAL: moderate pain;  no guarding;  no rebound tenderness;  normal bowel sounds; no organomegaly; no masses;     MUSCULOSKELETAL: gait: wheelchair-bound;     NEUROLOGIC: mental status: oriented to person, place, and time;  Reflexes: knee jerks: 2+;     PSYCHIATRIC:   appropriate affect and demeanor; normal speech pattern; grossly normal memory;         ASSESSMENT           537.89   K92.2  Gastrointestinal bleeding              DDx:     280.0   D50.0  Iron deficiency anemia, due to chronic blood loss              DDx:     338.4   G89.4  Chronic pain syndrome              DDx:     276.51   E86.0  Dehydration              DDx:     786.09   R06.00  Shortness of breath              DDx:         ORDERS:         Radiology/Test Orders:       [id number]  Radiologic exam chest 2 views  (Send-Out)           Lab Orders:       [id number]  [facility] CBC w/o diff  (Send-Out)         [id number]  COMP - [facility] Comp. Metabolic Panel  (Send-Out)                   PLAN:          Gastrointestinal bleeding ongoing, will recheck labs, going to get hospice involved          Iron deficiency anemia, due to chronic blood loss     LABORATORY:  Labs ordered to be performed today include CBC W/O DIFF and Comprehensive metabolic panel.            Orders:       [id number]  [facility] - [facility] CBC w/o diff  (Send-Out)         [id number]  COMP - [facility] Comp. Metabolic Panel  (Send-Out)            Chronic pain syndrome cont pain management with methadone and morphine, hospice is going to get involved          Dehydration will get her on supplements tid like boost, and have home health give her IVF 1 liter          Shortness of breath with low O2 sats-will set her up with oxygen.         RADIOLOGY:  I have ordered a chest x-ray (PA and lateral) to be done today.            Orders:       [id number]  Radiologic exam chest 2 views  (Send-Out)               CHARGE CAPTURE           **Please note: ICD descriptions below are intended for billing purposes only and may not represent clinical diagnoses**        Primary Diagnosis:         537.89 Gastrointestinal bleeding            K92.2    Gastrointestinal hemorrhage, unspecified              Orders:          [id number]   Office/outpatient visit; established patient, level 4  (In-House)           280.0 Iron deficiency  anemia, due to chronic blood loss            D50.0    Iron deficiency anemia secondary to blood loss (chronic)    338.4 Chronic pain syndrome            G89.4    Chronic pain syndrome    276.51 Dehydration            E86.0    Dehydration    786.09 Shortness of breath            R06.00    Dyspnea, unspecified        ADDENDUMS:      ____________________________________    Addendum: 03/04/2019 04:26 PM - Crorine Soriano         Laboratory Orders Faxed to:        [facility]; Number [phone]            Addendum: 03/05/2019 08:51 AM - One, Team         Visit Note Faxed to:        User Entered Recipient; Number [phone]            Addendum: 03/12/2019 09:38 AM - Julisa Bianchi         change billing code [id number] to a TCM  [address]82. [facility]

## 2021-06-05 NOTE — PROGRESS NOTES
Muriel Irene 1945     Office/Outpatient Visit    Visit Date: Mon, Jan 14, 2019 12:04 pm    Provider: Julisa Bianchi MD (Assistant: Sarah Spurling, MA)    Location: [facility]        Electronically signed by Julisa Bianchi MD on  01/15/2019 10:40:28 AM                             SUBJECTIVE:        CC: recent hospitalization for NSTEMI         HPI:     Yuliet had a cold and progressively got worse in her symptoms, she started to experience chest pressure and went to the ER where she was admitted for NSTEMI and pneumonia with sepsis, COPD, acute blood loss anemia due to upper GI bleeding, malnutrition, hypokalemia, weakness, and debelitation.  She was treated with IV fluids with IV antibiotics and 2 units PRBC. Cardiologist Dr Camara, Gen surgeon  Dr Chandler.  No scopes were done, bleeding stopped on its own.  She has [facility] coming in to the home for PT/OT and nursing.  D/C meds include MS contin (she sees pain management) lopressor, trazodone, levaquin, methodone, lorazepam, synthroid, protonix, synthroid and cymbalta.  She remains weak, difficulty standing independently and with walking with her walker.  She fell 5 Xs last week.  Her breathing is good.          Additionally, she presents with history of acquired hypothyroidism, unspecified cause.  this was first diagnosed more than 5 years ago.  She is currently taking Synthroid, 100 mcg daily.  TSH was last checked 3 months ago.  The result was reported as high.  She denies any related symptoms.  She reports no symptoms suggestive of adverse medication effect.      ROS:     CONSTITUTIONAL:  Positive for fatigue.   Negative for chills or fever.      EYES:  Positive for Legally blind.   Negative for blurred vision.      CARDIOVASCULAR:  Negative for chest pain, orthopnea, palpitations, paroxysmal nocturnal dyspnea, pedal edema and tachycardia.      RESPIRATORY:  Negative for recent cough, dyspnea and frequent wheezing.       GASTROINTESTINAL:  Positive for abdominal pain and nausea.   Negative for constipation, diarrhea or vomiting.      MUSCULOSKELETAL:  Positive for arthralgias, back pain, joint stiffness and hip pain R hip.      INTEGUMENTARY/BREAST:  Negative for rash.      PSYCHIATRIC:  Negative for sleep disturbance and suicidal thoughts.          PMH/FMH/SH:     Last Reviewed on 2019 12:45 PM by Julisa Bianchi    Past Medical History:             PREVENTIVE HEALTH MAINTENANCE             BONE DENSITY: was last done 6/17/15 with the following abnormality noted-- osteoporosis takes Fosamax and calcium with vitamin d     COLORECTAL CANCER SCREENING: Up to date (colonoscopy q10y; sigmoidoscopy q5y; Cologuard q3y) was last done 12, Results are in chart; colonoscopy with the following abnormalities noted-- diverticulosis     MAMMOGRAM: Done within last 2 years and results in are chart was last done 2017 with normal results         PAST MEDICAL HISTORY         Positive for    Hyperlipidemia and    Hypertension;     Positive for    COPD;     Postive for    Colonic Polyps,    Gastroesophageal Reflux Disease,    Melena due to iron intake and    Diverticulosis;     Positive for    Renal Stones,    Urinary Tract Infections, Recurrent and    Hydronephrosis;     Positive for    Osteoporosis and    Systemic Lupus Erythematosus;     Positive for    Hypothyroidism;     Positive for    Cerebrovascular Accident: 10/28/2001; ;     Positive for    Iron Deficiency Anemia: due to chronic blood loss; and    Primary hypercoagulable state;     Positive for    Chronic elevated WBC count;     Positive for    Underweight with Adult Failure to Thrive,    Anxiety with depression,    Cortical blindness and    Sudden hearing loss;         GYNECOLOGICAL HISTORY:             CURRENT MEDICAL PROVIDERS:    Gastroenterologist: Dr Dorantes    Oncologist: Dr Johnson    Pain Management: [facility]    Psychiatrist: [place]     Rheumatologist: Dr Eden    [place] [facility]: [facility] - d/c 2018             ADVANCED DIRECTIVES: None         Surgical History:         Positive for    Arthroscopy: shoulder, left;,    Cholecystectomy: ;,    Hysterectomy: BSO; and    Tonsillectomy + Adenoidectomy;     Positive for    Lung Resection: at age 19; for brochiectasis; ;     Positive for    Endoscopy: ; EGD 2016 bx-mild inflammation, hx ulcers;,    Fracture(s):    fracture of wrist: dx'd in 2016;     and right hip,     Exploratory Laparotomy,    Port placement;,    Wrist cyst surgery;,    cardiac cath  Normal; and    Cystoscopy  with Rt ureteroscopy and stone extraction;;         Family History:     Father:  at age 77; Cause of death was lung cancer     Mother:  at age 70; Cause of death was diabetes related;  cardiomyopathy         Social History:     Occupation: Disabled (due to Lupus)     Marital Status:      Children: 2 children         Tobacco/Alcohol/Supplements:     Last Reviewed on 2019 12:45 PM by Julisa Bianchi    Tobacco: She has never smoked.  Non-drinker         Substance Abuse History:     None             Allergies:     Last Reviewed on 2018 12:17 PM by Talisha Dubois April    Ceftin:    Lortab:    Proventil:    Percodan:    Toradol:    Xifaxan:        Current Medications:     Last Reviewed on 10/10/2018 11:26 AM by Morgan Yarbrough    Protonix 40mg Tablets, Delayed Release 1 tab bid     Synthroid 0.1mg Tablet Take 1 tablet(s) by mouth daily--DO NOT SUBSTITUTE!!!!!!!     Cymbalta 30mg Capsules, Delayed Release 1 po tid     Lorazepam 0.5mg Tablet 1 PO BID     Promethazine HCl 12.5mg Tablet 1 tab every 6 hours as needed for nausea/vomiting.     Trazodone HCl 100mg Tablet 1 tab hs     Ferrous Sulfate 325mg Tablets 1 tablet 3x/week     Methadone HCl 5mg Tablet Take one tablet TID     Zyprexa 5mg Tablet Take 1 tablet(s) by mouth daily at HS     Carafate 1gm Tablets Take 1 tablet(s) by mouth qid      Metoprolol 25mg Tablet 1/2 tab bid     Morphine Sulfate 15mg Tablet Take 1 Tablet BID         OBJECTIVE:        Vitals:         Current: 1/14/2019 12:11:14 PM    Ht:  5 ft, 5.25 in;  Wt: 94.4 lbs;  BMI: 15.6    T: 97.2 F (oral);  BP: 102/60 mm Hg (left arm, sitting);  P: 77 bpm (left arm (BP Cuff), sitting);  sCr: 0.72 mg/dL;  GFR: 57.92        Exams:     PHYSICAL EXAM:     GENERAL: vital signs recorded - thin, malnourished;  disheveled appearance;  no apparent distress, tired-appearing;     EYES: R sclera is injected, no drainage; PERRL, EOMI     E/N/T: OROPHARYNX:  normal mucosa, dentition, gingiva, and posterior pharynx;     NECK: supple, no LAD, FROM;     RESPIRATORY: normal respiratory rate and pattern with no distress; normal breath sounds with no rales, rhonchi, wheezes or rubs;     CARDIOVASCULAR: regular rate and rhythm; normal S1, S2; no murmur, rub, or gallop; normal PMI;     GASTROINTESTINAL: moderate pain;  no guarding;  no rebound tenderness;  normal bowel sounds; no organomegaly; no masses;     MUSCULOSKELETAL: gait: wheelchair-bound;     NEUROLOGIC: mental status: oriented to person, place, and time;  Reflexes: knee jerks: 2+;     PSYCHIATRIC:  appropriate affect and demeanor; normal speech pattern; grossly normal memory;         ASSESSMENT           413.9   I25.118  Myocardial ischemia              DDx:     379.93   H10.32  Red eye              DDx:     537.89   K92.2  Gastrointestinal bleeding              DDx:     280.0   D50.0  Iron deficiency anemia, due to chronic blood loss              DDx:     486   J18.8  Community acquired pneumonia              DDx:     338.4   G89.4  Chronic pain syndrome              DDx:     244.9   E03.9  Acquired hypothyroidism, unspecified cause              DDx:         ORDERS:         Meds Prescribed:       Polytrim (Polymyxin B/Trimethoprim ) Ophthalmic Solution 1 drop to both eyes tid x 7 days  #1 (One) bottle Refills: 0         Radiology/Test Orders:        [id number]  Radiologic exam chest 2 views  (Send-Out)           Lab Orders:       [id number]  BDCB2 - [facility] CBC w/o diff  (Send-Out)         [id number]  COMP - [facility] Comp. Metabolic Panel  (Send-Out)         38089  TSH - [facility] TSH  (Send-Out)                   PLAN:          Red eye           Prescriptions:       Polytrim (Polymyxin B/Trimethoprim ) Ophthalmic Solution 1 drop to both eyes tid x 7 days  #1 (One) bottle Refills: 0          Gastrointestinal bleeding no current signs active GI bleed          Iron deficiency anemia, due to chronic blood loss will recheck her blood work          Community acquired pneumonia     LABORATORY:  Labs ordered to be performed today include CBC W/O DIFF and Comprehensive metabolic panel.      RADIOLOGY:  I have ordered a chest x-ray (PA and lateral) to be done today.            Orders:       [id number]  CB2 - [facility] CBC w/o diff  (Send-Out)         [id number]  COMP - [facility] Comp. Metabolic Panel  (Send-Out)         [id number]  Radiologic exam chest 2 views  (Send-Out)            Chronic pain syndrome cont pain management          Acquired hypothyroidism, unspecified cause will repeat labs today     LABORATORY:  Labs ordered to be performed today include TSH.            Orders:       [id number]  [facility] - [facility] TSH  (Send-Out)               CHARGE CAPTURE           **Please note: ICD descriptions below are intended for billing purposes only and may not represent clinical diagnoses**        Primary Diagnosis:         413.9 Myocardial ischemia            I25.118    Atherosclerotic heart disease of native coronary artery with other forms of angina pectoris              Orders:          [id number]   Office/outpatient visit; established patient, level 4  (In-House)           379.93 Red eye            H10.32    Unspecified acute conjunctivitis, left eye    537.89 Gastrointestinal bleeding            K92.2    Gastrointestinal hemorrhage, unspecified    280.0 Iron deficiency anemia, due to chronic blood loss            D50.0    Iron deficiency  anemia secondary to blood loss (chronic)    486 Community acquired pneumonia            J18.8    Other pneumonia, unspecified organism    338.4 Chronic pain syndrome            G89.4    Chronic pain syndrome    244.9 Acquired hypothyroidism, unspecified cause            E03.9    Hypothyroidism, unspecified

## 2021-07-01 VITALS
HEIGHT: 65 IN | SYSTOLIC BLOOD PRESSURE: 140 MMHG | WEIGHT: 98 LBS | BODY MASS INDEX: 16.33 KG/M2 | TEMPERATURE: 98.3 F | HEART RATE: 86 BPM | DIASTOLIC BLOOD PRESSURE: 85 MMHG

## 2021-07-01 VITALS
RESPIRATION RATE: 16 BRPM | DIASTOLIC BLOOD PRESSURE: 56 MMHG | TEMPERATURE: 97.1 F | HEIGHT: 65 IN | SYSTOLIC BLOOD PRESSURE: 97 MMHG | BODY MASS INDEX: 13.99 KG/M2 | WEIGHT: 84 LBS | OXYGEN SATURATION: 88 % | HEART RATE: 69 BPM

## 2021-07-01 VITALS
TEMPERATURE: 97.2 F | SYSTOLIC BLOOD PRESSURE: 102 MMHG | BODY MASS INDEX: 15.73 KG/M2 | HEIGHT: 65 IN | WEIGHT: 94.4 LBS | HEART RATE: 77 BPM | DIASTOLIC BLOOD PRESSURE: 60 MMHG

## 2021-07-01 VITALS
WEIGHT: 96.6 LBS | TEMPERATURE: 97.9 F | HEART RATE: 68 BPM | HEIGHT: 65 IN | BODY MASS INDEX: 16.1 KG/M2 | DIASTOLIC BLOOD PRESSURE: 64 MMHG | SYSTOLIC BLOOD PRESSURE: 120 MMHG

## 2021-07-01 VITALS
SYSTOLIC BLOOD PRESSURE: 126 MMHG | HEIGHT: 65 IN | BODY MASS INDEX: 13.16 KG/M2 | TEMPERATURE: 97.9 F | HEART RATE: 65 BPM | WEIGHT: 79 LBS | DIASTOLIC BLOOD PRESSURE: 78 MMHG

## 2021-07-01 VITALS
TEMPERATURE: 98.9 F | DIASTOLIC BLOOD PRESSURE: 64 MMHG | WEIGHT: 96.6 LBS | HEART RATE: 78 BPM | SYSTOLIC BLOOD PRESSURE: 142 MMHG | BODY MASS INDEX: 16.1 KG/M2 | HEIGHT: 65 IN